# Patient Record
Sex: FEMALE | Race: WHITE | NOT HISPANIC OR LATINO | Employment: FULL TIME | URBAN - METROPOLITAN AREA
[De-identification: names, ages, dates, MRNs, and addresses within clinical notes are randomized per-mention and may not be internally consistent; named-entity substitution may affect disease eponyms.]

---

## 2018-08-14 PROBLEM — J32.9 SINUSITIS: Status: ACTIVE | Noted: 2018-08-14

## 2020-05-18 PROBLEM — M77.11 RIGHT LATERAL EPICONDYLITIS: Status: ACTIVE | Noted: 2020-05-18

## 2020-06-22 ENCOUNTER — OFFICE VISIT (OUTPATIENT)
Dept: PHYSICAL MEDICINE AND REHAB | Facility: CLINIC | Age: 40
End: 2020-06-22
Payer: COMMERCIAL

## 2020-06-22 VITALS — BODY MASS INDEX: 30.21 KG/M2 | WEIGHT: 160 LBS | HEIGHT: 61 IN

## 2020-06-22 DIAGNOSIS — M77.8 TENDINITIS OF RIGHT TRICEPS: Primary | ICD-10-CM

## 2020-06-22 DIAGNOSIS — S46.311A STRAIN OF RIGHT TRICEPS TENDON, INITIAL ENCOUNTER: ICD-10-CM

## 2020-06-22 PROCEDURE — 99243 PR OFFICE CONSULTATION,LEVEL III: ICD-10-PCS | Mod: 25,S$GLB,, | Performed by: PHYSICAL MEDICINE & REHABILITATION

## 2020-06-22 PROCEDURE — 99999 PR PBB SHADOW E&M-EST. PATIENT-LVL III: ICD-10-PCS | Mod: PBBFAC,,, | Performed by: PHYSICAL MEDICINE & REHABILITATION

## 2020-06-22 PROCEDURE — 76882 PR  US,EXTREMITY,NONVASCULAR,REAL-TIME IMAGE,LIMITED: ICD-10-PCS | Mod: S$GLB,,, | Performed by: PHYSICAL MEDICINE & REHABILITATION

## 2020-06-22 PROCEDURE — 99999 PR PBB SHADOW E&M-EST. PATIENT-LVL III: CPT | Mod: PBBFAC,,, | Performed by: PHYSICAL MEDICINE & REHABILITATION

## 2020-06-22 PROCEDURE — 99243 OFF/OP CNSLTJ NEW/EST LOW 30: CPT | Mod: 25,S$GLB,, | Performed by: PHYSICAL MEDICINE & REHABILITATION

## 2020-06-22 PROCEDURE — 76882 US LMTD JT/FCL EVL NVASC XTR: CPT | Mod: S$GLB,,, | Performed by: PHYSICAL MEDICINE & REHABILITATION

## 2020-06-22 NOTE — LETTER
June 22, 2020      Linda Tran MD  [address] [facility] [address]  [address]  [facility] [address]           [facility] Physical Med/Rehab  [address]  [facility] [address]  Phone: [phone]  Fax: [phone]          Patient: Ina Pereira   MR Number: [id number]   YOB: 1980   Date of Visit: 6/22/2020       Dear Dr. Linda Tran:    Thank you for referring Ina Pereira to me for evaluation. Attached you will find relevant portions of my assessment and plan of care.    If you have questions, please do not hesitate to call me. I look forward to following Ina Pereira along with you.    Sincerely,    Gordy Yang MD    Enclosure  CC:  No Recipients    If you would like to receive this communication electronically, please contact [email or web address] or [phone] to request more information on [employer] access.    For providers and/or their staff who would like to refer a patient to [facility], please contact us through our one-stop-shop provider referral line, [facility], at [phone].    If you feel you have received this communication in error or would no longer like to receive these types of communications, please e-mail [email or web address]

## 2020-06-22 NOTE — PROGRESS NOTES
[facility]    Consulting Provider: Linda Tran MD    CHIEF COMPLAINT:   Chief Complaint   Patient presents with    Elbow Pain     Right     HISTORY OF PRESENT ILLNESS: Ina Pereira is a RHD 39 y.o. female who presents to me for evaluation of right elbow pain. The dull pain started 2 years ago with gradual onset, possibly related to pulling her son.  She says that received 3 corticosteroid injections with orthopaedics that provided excellent relief for the first 2 injections with minimal relief for the final injection. Per chart review, she has received 2 olecranon bursa injections and more recently 2 lateral epicondylitis injections. The last lateral epicondyle injection was in April with medrol dose pack, and the patient said the relief felt different than usual and changed her pain.  Picking up her son, any pulling or pushing, and turning a doorknob provokes the pain.  She tried Mobic and completed OT at [facility] with heat, US, with minimal relief and no dry needling.  She endorses weakness at the elbow but denied trauma, neck pain, swelling, or numbness.    Review of Systems   Constitutional: Negative for fever.   HENT: Negative for drooling.    Eyes: Negative for discharge.   Respiratory: Negative for choking.    Cardiovascular: Negative for chest pain.   Genitourinary: Negative for flank pain.   Skin: Negative for wound.   Allergic/Immunologic: Negative for immunocompromised state.   Neurological: Negative for tremors and syncope.   Psychiatric/Behavioral: Negative for behavioral problems.     Past Medical History:   Past Medical History:   Diagnosis Date    Abnormal vaginal Pap smear     ADHD (attention deficit hyperactivity disorder)     Alopecia        Past Surgical History:   Past Surgical History:   Procedure Laterality Date    CERVIX REMOVAL         Family History:   Family History   Problem Relation Age of Onset    Diabetes Mother     Hypertension Father      Diabetes Father     Hyperlipidemia Father        Medications:   Current Outpatient Medications on File Prior to Visit   Medication Sig Dispense Refill    lisdexamfetamine (VYVANSE) 70 MG capsule VYVANSE 70 MG CAPS      meloxicam (MOBIC) 15 MG tablet TAKE 1 TABLET BY MOUTH EVERY DAY 30 tablet 1    methylPREDNISolone (MEDROL DOSEPACK) 4 mg tablet use as directed 1 Package 0     No current facility-administered medications on file prior to visit.        Allergies: Review of patient's allergies indicates:  No Known Allergies    Social History:   Social History     Socioeconomic History    Marital status:      Spouse name: Not on file    Number of children: Not on file    Years of education: Not on file    Highest education level: Not on file   Occupational History    Not on file   Social Needs    Financial resource strain: Not on file    Food insecurity     Worry: Not on file     Inability: Not on file    Transportation needs     Medical: Not on file     Non-medical: Not on file   Tobacco Use    Smoking status: Former Smoker     Types: Vaping with nicotine     Quit date: 2014     Years since quittin.1    Smokeless tobacco: Never Used   Substance and Sexual Activity    Alcohol use: Yes     Comment: rarely    Drug use: Not on file    Sexual activity: Not on file   Lifestyle    Physical activity     Days per week: Not on file     Minutes per session: Not on file    Stress: Not on file   Relationships    Social connections     Talks on phone: Not on file     Gets together: Not on file     Attends Holiness service: Not on file     Active member of club or organization: Not on file     Attends meetings of clubs or organizations: Not on file     Relationship status: Not on file   Other Topics Concern    Not on file   Social History Narrative    Not on file     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     PHYSICAL EXAMINATION:   General    Vitals:    20 1429  "  Weight: 72.6 kg (160 lb)   Height: 5' 1" (1.549 m)     Constitutional: Oriented to person, place, and time. No apparent distress. Pleasant.  HENT:   Head: Normocephalic and atraumatic.   Eyes: Right eye exhibits no discharge. Left eye exhibits no discharge. No scleral icterus.   Pulmonary/Chest: Effort normal. No respiratory distress.   Abdominal: There is no guarding.   Neurological: Alert and oriented to person, place, and time.   Psychiatric: Behavior is normal.   Back Exam     Tenderness   The patient is experiencing no tenderness.     Comments:  Negative Spurling's  2+ DTRs BUE      Right Elbow Exam     Tenderness   Right elbow tenderness location: Point of maximal tenderness is at the triceps insertion upon the olecranon.     Range of Motion   Extension: 0   Flexion: 140   Pronation: normal   Supination: normal     Muscle Strength   Pronation:  5/5   Supination:  5/5     Tests   Varus: negative  Valgus: negative  Tinel's sign (cubital tunnel): negative    Other   Erythema: absent  Scars: absent  Sensation: normal    Comments:  Pain reproduced with resisted elbow extension  Negative Cozen's test        INSPECTION: There is no swelling, ecchymoses, erythema or gross deformity of the elbow.    Imaging  MRI Right elbow 6/8/20  Impression:  1. Subtle edema at the distal insertion of the brachialis tendon suggestive of mild tendinopathy.  2. Edema within the distal fibers of the triceps tendon with adjacent peritendinous edema compatible with tendinopathy.  3. Trace edema within the common extensor tendon origin may reflect minimal tendinopathy.  4. There is increased T2 signal demonstrated within the ulnar nerve at the cubital tunnel level should be correlated for ulnar neuropathy..     X-ray right elbow 5/29/18  IMPRESSION  No displaced fracture or dislocation is appreciated.    Diagnostic Ultrasound:  Limited diagnostic ultrasound was performed today of the right elbow. The images were saved will be uploaded to " EMR.  The common extensor tendon was observed to attach upon the lateral epicondyle of the humerus.  There is no anechoic gapping within the tendon substance that would suggest significant tearing.  There was no lateral elbow joint effusion.  The radial collateral ligament was intact. There was no significant cortical irregularity of the lateral condyle humerus. There is no significant hyperemia or neovascularization seen within the tendon on color Doppler function.  The proximal portion of the common extensor tendon was non-tender to sonopalpation.    The triceps tendon was then visualized and long-axis attaching upon the olecranon.  There was some cortical irregularity of the attachment site upon the olecranon process.  There also appeared to be hyperechoic osteophyte projecting proximally into the tendon substance.  This area was tender to so no palpation.  There was a mild degree of hyperemia of the soft tendon tissue adjacent to the enthesophyte.  There was no obvious tearing.    Data Reviewed: X-ray, MRI, ultrasound    Supportive Actions: Independent visualization of images or test specimens    ASSESSMENT:   1. Strain of right triceps tendon, initial encounter    2. Lateral epicondylitis, right elbow      PLAN:      1. Time was spent reviewing the above diagnosis in depth with Ina today, including acute management and rehabilitation.  Her symptoms, physical exam, and diagnostic ultrasound exam are suggestive of triceps tendinopathy being the primary generator of her symptoms, with lateral epicondylosis likely be secondary.  Diagnostic ultrasound exam today of the common extensor and triceps tendon did show some degree of pathology in the form of chronic tendinosis.  Mild hyperemia was noted in both tendons but there were no significant defects in the tendon or significant joint effusion.  We discussed additional conservative measures to include a peritendinous triceps  corticosteroid injection, PRP, Tenex  "as well as bracing and occupational therapy to specifically address triceps tendinopathy.      2. We discussed the potential deleterious effects of repeated injections of corticosteroid, especially considering her pain continues to return after previous injections.  She reports her level of function and quality of life is significantly decreased in recent weeks due to her elbow pain. We discussed the duration of recovery and potentially increased pain associated with PRP and tenex. After discussion, she would like to proceed with tenex as opposed to restarting formal occupational therapy or considering PRP.  we discussed that it will be important to reduce forces more than 5 lb especially for the 1st 2 weeks after the Tenex procedure.  We discussed the course of improvement will likely be gradual it may take over 12 weeks.  We discussed that this procedure does not guarantee complete or permanent pain resolution or the need for further interventions.  She voiced understanding wished to proceed.    3. RTC for scheduled right triceps Tenex.  If the pain persists, we can also consider PRP.    This is a consult from Dr. Linda Tran. Please see the "Communications" section of Epic to see how the consulting physician received the report of today's findings and recommendations. If it's an [facility] physician, it will be forwarded to his/her "in basket".    The above note was completed, in part, with the aid of Dragon dictation software/hardware. Translation errors may be present.    "

## 2020-06-28 ENCOUNTER — CLINICAL SUPPORT (OUTPATIENT)
Dept: URGENT CARE | Facility: CLINIC | Age: 40
End: 2020-06-28
Payer: COMMERCIAL

## 2020-06-28 VITALS — TEMPERATURE: 98 F

## 2020-06-28 DIAGNOSIS — M77.8 TENDINITIS OF RIGHT TRICEPS: ICD-10-CM

## 2020-06-28 DIAGNOSIS — S46.311A STRAIN OF RIGHT TRICEPS TENDON, INITIAL ENCOUNTER: ICD-10-CM

## 2020-06-28 PROCEDURE — U0003 INFECTIOUS AGENT DETECTION BY NUCLEIC ACID (DNA OR RNA); SEVERE ACUTE RESPIRATORY SYNDROME CORONAVIRUS 2 (SARS-COV-2) (CORONAVIRUS DISEASE [COVID-19]), AMPLIFIED PROBE TECHNIQUE, MAKING USE OF HIGH THROUGHPUT TECHNOLOGIES AS DESCRIBED BY CMS-2020-01-R: HCPCS

## 2020-07-01 DIAGNOSIS — M77.8 TRICEPS TENDONITIS: Primary | ICD-10-CM

## 2020-07-02 LAB — SARS-COV-2 RNA RESP QL NAA+PROBE: NOT DETECTED

## 2020-07-08 DIAGNOSIS — M25.521 RIGHT ELBOW PAIN: Primary | ICD-10-CM

## 2020-07-08 PROBLEM — M77.11 RIGHT LATERAL EPICONDYLITIS: Status: RESOLVED | Noted: 2020-05-18 | Resolved: 2020-07-08

## 2020-07-08 RX ORDER — SODIUM CHLORIDE, SODIUM LACTATE, POTASSIUM CHLORIDE, CALCIUM CHLORIDE 600; 310; 30; 20 MG/100ML; MG/100ML; MG/100ML; MG/100ML
INJECTION, SOLUTION INTRAVENOUS CONTINUOUS
Status: CANCELLED | OUTPATIENT
Start: 2020-07-08

## 2020-07-08 RX ORDER — LIDOCAINE HYDROCHLORIDE 10 MG/ML
1 INJECTION, SOLUTION EPIDURAL; INFILTRATION; INTRACAUDAL; PERINEURAL ONCE
Status: CANCELLED | OUTPATIENT
Start: 2020-07-08 | End: 2020-07-08

## 2020-07-08 RX ORDER — MIDAZOLAM HYDROCHLORIDE 1 MG/ML
2 INJECTION INTRAMUSCULAR; INTRAVENOUS ONCE AS NEEDED
Status: CANCELLED | OUTPATIENT
Start: 2020-07-08 | End: 2031-12-05

## 2020-07-09 ENCOUNTER — TELEPHONE (OUTPATIENT)
Dept: PHYSICAL MEDICINE AND REHAB | Facility: CLINIC | Age: 40
End: 2020-07-09

## 2020-07-09 DIAGNOSIS — Z01.818 PREOP TESTING: Primary | ICD-10-CM

## 2020-07-21 ENCOUNTER — LAB VISIT (OUTPATIENT)
Dept: FAMILY MEDICINE | Facility: CLINIC | Age: 40
End: 2020-07-21
Payer: COMMERCIAL

## 2020-07-21 DIAGNOSIS — Z01.818 PREOP TESTING: ICD-10-CM

## 2020-07-21 PROCEDURE — U0003 INFECTIOUS AGENT DETECTION BY NUCLEIC ACID (DNA OR RNA); SEVERE ACUTE RESPIRATORY SYNDROME CORONAVIRUS 2 (SARS-COV-2) (CORONAVIRUS DISEASE [COVID-19]), AMPLIFIED PROBE TECHNIQUE, MAKING USE OF HIGH THROUGHPUT TECHNOLOGIES AS DESCRIBED BY CMS-2020-01-R: HCPCS

## 2020-07-22 LAB — SARS-COV-2 RNA RESP QL NAA+PROBE: NOT DETECTED

## 2020-07-24 ENCOUNTER — HOSPITAL ENCOUNTER (OUTPATIENT)
Facility: HOSPITAL | Age: 40
Discharge: HOME | End: 2020-07-24
Attending: PHYSICAL MEDICINE & REHABILITATION | Admitting: PHYSICAL MEDICINE & REHABILITATION
Payer: COMMERCIAL

## 2020-07-24 VITALS
HEART RATE: 88 BPM | SYSTOLIC BLOOD PRESSURE: 131 MMHG | WEIGHT: 158 LBS | RESPIRATION RATE: 18 BRPM | DIASTOLIC BLOOD PRESSURE: 82 MMHG | OXYGEN SATURATION: 100 % | BODY MASS INDEX: 31.02 KG/M2 | HEIGHT: 60 IN | TEMPERATURE: 97 F

## 2020-07-24 DIAGNOSIS — M77.11 RIGHT LATERAL EPICONDYLITIS: Primary | ICD-10-CM

## 2020-07-24 DIAGNOSIS — M25.521 RIGHT ELBOW PAIN: ICD-10-CM

## 2020-07-24 LAB
B-HCG UR QL: NEGATIVE
CTP QC/QA: YES

## 2020-07-24 PROCEDURE — 36000707: Mod: PO | Performed by: PHYSICAL MEDICINE & REHABILITATION

## 2020-07-24 PROCEDURE — 81025 URINE PREGNANCY TEST: CPT | Mod: PO | Performed by: STUDENT IN AN ORGANIZED HEALTH CARE EDUCATION/TRAINING PROGRAM

## 2020-07-24 PROCEDURE — 63600175 PHARM REV CODE 636 W HCPCS: Mod: PO | Performed by: STUDENT IN AN ORGANIZED HEALTH CARE EDUCATION/TRAINING PROGRAM

## 2020-07-24 PROCEDURE — 99152 MOD SED SAME PHYS/QHP 5/>YRS: CPT | Mod: ,,, | Performed by: PHYSICAL MEDICINE & REHABILITATION

## 2020-07-24 PROCEDURE — 36000706: Mod: PO | Performed by: PHYSICAL MEDICINE & REHABILITATION

## 2020-07-24 PROCEDURE — 24357 PR TENOTOMY ELBOW LATERAL/MEDIAL PERCUTANEOUS: ICD-10-PCS | Mod: RT,,, | Performed by: PHYSICAL MEDICINE & REHABILITATION

## 2020-07-24 PROCEDURE — 99152 PR MOD CONSCIOUS SEDATION, SAME PHYS, 5+ YRS, FIRST 15 MIN: ICD-10-PCS | Mod: ,,, | Performed by: PHYSICAL MEDICINE & REHABILITATION

## 2020-07-24 PROCEDURE — 24357 REPAIR ELBOW PERC: CPT | Mod: RT,,, | Performed by: PHYSICAL MEDICINE & REHABILITATION

## 2020-07-24 PROCEDURE — 63600175 PHARM REV CODE 636 W HCPCS: Mod: PO | Performed by: PHYSICAL MEDICINE & REHABILITATION

## 2020-07-24 RX ORDER — SODIUM CHLORIDE, SODIUM LACTATE, POTASSIUM CHLORIDE, CALCIUM CHLORIDE 600; 310; 30; 20 MG/100ML; MG/100ML; MG/100ML; MG/100ML
INJECTION, SOLUTION INTRAVENOUS CONTINUOUS
Status: DISCONTINUED | OUTPATIENT
Start: 2020-07-24 | End: 2020-07-24 | Stop reason: HOSPADM

## 2020-07-24 RX ORDER — LIDOCAINE HYDROCHLORIDE 10 MG/ML
1 INJECTION, SOLUTION EPIDURAL; INFILTRATION; INTRACAUDAL; PERINEURAL ONCE
Status: DISCONTINUED | OUTPATIENT
Start: 2020-07-24 | End: 2020-07-24 | Stop reason: HOSPADM

## 2020-07-24 RX ORDER — FENTANYL CITRATE 50 UG/ML
INJECTION, SOLUTION INTRAMUSCULAR; INTRAVENOUS
Status: DISCONTINUED | OUTPATIENT
Start: 2020-07-24 | End: 2020-07-24 | Stop reason: HOSPADM

## 2020-07-24 RX ORDER — TRAMADOL HYDROCHLORIDE 50 MG/1
50 TABLET ORAL EVERY 4 HOURS PRN
Qty: 6 TABLET | Refills: 0 | Status: SHIPPED | OUTPATIENT
Start: 2020-07-24 | End: 2021-12-15

## 2020-07-24 RX ORDER — MIDAZOLAM HYDROCHLORIDE 1 MG/ML
2 INJECTION INTRAMUSCULAR; INTRAVENOUS ONCE AS NEEDED
Status: COMPLETED | OUTPATIENT
Start: 2020-07-24 | End: 2020-07-24

## 2020-07-24 RX ADMIN — SODIUM CHLORIDE, SODIUM LACTATE, POTASSIUM CHLORIDE, AND CALCIUM CHLORIDE: .6; .31; .03; .02 INJECTION, SOLUTION INTRAVENOUS at 11:07

## 2020-07-24 NOTE — OP NOTE
Operative Note       Surgery Date: 7/24/2020     Surgeon(s) and Role:     * Gordy Yang MD - Primary    Pre-op Diagnosis:  Triceps tendonitis [M77.9]    Post-op Diagnosis: Post-Op Diagnosis Codes:     * Triceps tendonitis [M77.9]    Procedure(s) (LRB):  Ultrasonic percutaneous tenotomy of the right common extensor tendon (Right)    Anesthesia: RN IV Sedation    Procedure in Detail/Findings:    Indications:       This is a 40 y.o. female with recalcitrant right common extensor tendon tendinosis who presents for elective percutaneous tenotomy of the right elbow. We discussed the risk, benefits and alternatives, and she elected to proceed.       Description of Procedure:       Once she was brought to the operating room, a time-out was performed confirming the name, the location and the procedure. The patient was the patient was placed in the supine position with the right elbow flexed to about 40 degrees. The skin overlying the right common extensor tendon was prepped using a ChloraPrep solution. Using ultrasound, the anterior/proximal common extensor tendon was observed to be heterogenous, consistent with a diagnosis of lateral epicondylosis. After diffuse administration of lidocaine 1%, a #11 blade was used to make a small incision. A 18g angiocath was then used to create a tract for the Tenex device. The TX1 handpiece was then introduced down to the tendon defects. With 1 minute and 54 seconds of energy time, the tendon defect was ablated. The Tenex hand piece was then removed. A sterile compression dressing was applied with an ACE wrap. The patient was returned to the recovery area in good condition. She was instructed to not lift more than 5 lbs until cleared by me. We will follow up with her in two weeks in the clinic.    Estimated Blood Loss: Minimal           Condition: Good    Attestation:  I performed the procedure.    The patient was given conscious sedation by a registered nurse with me in  attendance. The agents used were fentanyl and Versed. There was continuous monitoring of EKG, blood pressure and pulse oximetry. Total time for conscious sedation was 50 minutes.          Discharge Note    Admit Date: 7/24/2020    Attending Physician: Gordy Yang MD     Discharge Physician: Gordy Yang MD    Final Diagnosis: Post-Op Diagnosis Codes:     * Triceps tendonitis [M77.9]    Disposition: Home or Self Care, pt discharged and will f/u in clinic in 2 weeks.    Patient Instructions:   Current Discharge Medication List      CONTINUE these medications which have NOT CHANGED    Details   lisdexamfetamine (VYVANSE) 70 MG capsule VYVANSE 70 MG CAPS      meloxicam (MOBIC) 15 MG tablet TAKE 1 TABLET BY MOUTH EVERY DAY  Qty: 30 tablet, Refills: 1             Discharge Procedure Orders (must include Diet, Follow-up, Activity)   Discharge Procedure Orders (must include Diet, Follow-up, Activity)   Diet general     Ice to affected area     Call MD for:  temperature >100.4     Call MD for:  persistent nausea and vomiting     Call MD for:  severe uncontrolled pain     Call MD for:  difficulty breathing, headache or visual disturbances     Call MD for:  redness, tenderness, or signs of infection (pain, swelling, redness, odor or green/yellow discharge around incision site)     Call MD for:  hives     Call MD for:  persistent dizziness or light-headedness     Call MD for:  extreme fatigue     Remove dressing in 48 hours     Shower on day dressing removed (No bath)        Discharge Date: 7/24/20

## 2020-07-24 NOTE — PLAN OF CARE
Patient refuses oral liquids at this time. No apparent s&s of distress noted at this time, no complaints voiced at this time. Injection site free from redness and drainage.  Discharge instructions reviewed with patient/family/friend with good verbal feedback received. Patient ready for discharge

## 2020-07-24 NOTE — DISCHARGE INSTRUCTIONS
Tenex Elbow    General Instructions   Keep bandages and procedure area clean and dry    Remove dressing in 48 hours   May appply ice for 20 minutes off/on as needed for discomfort   Take over the counter pain medication (tylenol) as directed by the    Avoid ibuprofen, advilm motrin, aleve or naproxen   Avoid submerging the are in water (i.e. Swimmin/bathin/hot tube) for 5 days   Wear sling at all times except for bathing       Rest elbow today   No lifting more than a coffee cup with the effected arm for 2 weeks   Start daily gentle, non-painful range of motion exercise on 3rd day   Sedentary body activity for 3 weeks   Follow up in 2-3 weeks     Contact office at  [phone]   If area becomes red or hot to touch   For increased pain or swelling   Drainage from the site

## 2020-07-24 NOTE — H&P
CHIEF COMPLAINT:        Chief Complaint   Patient presents with    Elbow Pain       Right     HISTORY OF PRESENT ILLNESS: nIa Pereira is a RHD 39 y.o. female who presents to me for elective right elbow Tenex. She reports more focal pain at the common extensor tendon origin today.    Review of Systems   Constitutional: Negative for fever.   HENT: Negative for drooling.    Eyes: Negative for discharge.   Respiratory: Negative for choking.    Cardiovascular: Negative for chest pain.   Genitourinary: Negative for flank pain.   Skin: Negative for wound.   Allergic/Immunologic: Negative for immunocompromised state.   Neurological: Negative for tremors and syncope.   Psychiatric/Behavioral: Negative for behavioral problems.      Past Medical History:        Past Medical History:   Diagnosis Date    Abnormal vaginal Pap smear      ADHD (attention deficit hyperactivity disorder)      Alopecia          Past Surgical History:         Past Surgical History:   Procedure Laterality Date    CERVIX REMOVAL            Family History:         Family History   Problem Relation Age of Onset    Diabetes Mother      Hypertension Father      Diabetes Father      Hyperlipidemia Father          Medications:          Current Outpatient Medications on File Prior to Visit   Medication Sig Dispense Refill    lisdexamfetamine (VYVANSE) 70 MG capsule VYVANSE 70 MG CAPS        meloxicam (MOBIC) 15 MG tablet TAKE 1 TABLET BY MOUTH EVERY DAY 30 tablet 1    methylPREDNISolone (MEDROL DOSEPACK) 4 mg tablet use as directed 1 Package 0      No current facility-administered medications on file prior to visit.         Allergies: Review of patient's allergies indicates:  No Known Allergies     Social History:   Social History            Socioeconomic History    Marital status:        Spouse name: Not on file    Number of children: Not on file    Years of education: Not on file    Highest education level: Not on file    Occupational History    Not on file   Social Needs    Financial resource strain: Not on file    Food insecurity       Worry: Not on file       Inability: Not on file    Transportation needs       Medical: Not on file       Non-medical: Not on file   Tobacco Use    Smoking status: Former Smoker       Types: Vaping with nicotine       Quit date: 2014       Years since quittin.1    Smokeless tobacco: Never Used   Substance and Sexual Activity    Alcohol use: Yes       Comment: rarely    Drug use: Not on file    Sexual activity: Not on file   Lifestyle    Physical activity       Days per week: Not on file       Minutes per session: Not on file    Stress: Not on file   Relationships    Social connections       Talks on phone: Not on file       Gets together: Not on file       Attends Mosque service: Not on file       Active member of club or organization: Not on file       Attends meetings of clubs or organizations: Not on file       Relationship status: Not on file   Other Topics Concern    Not on file   Social History Narrative    Not on file     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      PHYSICAL EXAMINATION:   General           VSS  Constitutional: Oriented to person, place, and time. No apparent distress. Pleasant.  HENT:   Head: Normocephalic and atraumatic.   Eyes: Right eye exhibits no discharge. Left eye exhibits no discharge. No scleral icterus.   Pulmonary/Chest: Effort normal. No respiratory distress.   Abdominal: There is no guarding.   Neurological: Alert and oriented to person, place, and time.   Psychiatric: Behavior is normal.   Right Elbow Exam      Tenderness   Right elbow tenderness location: Point of maximal tenderness is at the lateral epicondyle, less so at triceps insertion     Range of Motion   Extension: 0   Flexion: 140   Pronation: normal   Supination: normal      Muscle Strength   Pronation:  5/5   Supination:  5/5      Tests   Varus:  negative  Valgus: negative  Tinel's sign (cubital tunnel): negative     Other   Erythema: absent  Scars: absent  Sensation: normal     Comments:  Pain with resisted elbow extension  + Cozen's test     INSPECTION: There is no swelling, ecchymoses, erythema or gross deformity of the right elbow.     Imaging  MRI Right elbow 6/8/20  Impression:  1. Subtle edema at the distal insertion of the brachialis tendon suggestive of mild tendinopathy.  2. Edema within the distal fibers of the triceps tendon with adjacent peritendinous edema compatible with tendinopathy.  3. Trace edema within the common extensor tendon origin may reflect minimal tendinopathy.  4. There is increased T2 signal demonstrated within the ulnar nerve at the cubital tunnel level should be correlated for ulnar neuropathy..      X-ray right elbow 5/29/18  IMPRESSION  No displaced fracture or dislocation is appreciated.     Diagnostic Ultrasound:  Limited diagnostic ultrasound was performed today of the right elbow. The images were saved will be uploaded to EMR.  The common extensor tendon was observed to attach upon the lateral epicondyle of the humerus.  There is no anechoic gapping within the tendon substance that would suggest significant tearing.  There was no lateral elbow joint effusion.  The radial collateral ligament was intact. There was no significant cortical irregularity of the lateral condyle humerus. There is no significant hyperemia or neovascularization seen within the tendon on color Doppler function.  The proximal portion of the common extensor tendon was tender to sonopalpation.     The triceps tendon was then visualized and long-axis attaching upon the olecranon.  There was some cortical irregularity of the attachment site upon the olecranon process.  There also appeared to be hyperechoic osteophyte projecting proximally into the tendon substance.  This area was tender to so no palpation, but less so than the triceps.  There was a mild  degree of hyperemia of the soft tendon tissue adjacent to the enthesophyte.  There was no obvious tearing.     Data Reviewed: X-ray, MRI, ultrasound     Supportive Actions: Independent visualization of images or test specimens     ASSESSMENT:   1. Strain of right triceps tendon, initial encounter    2. Lateral epicondylitis, right elbow      PLAN:       1. Her pain has changed compared to clinic visit last month. She reports very focal pain at the lateral epicondyle. Direct palpation of the area under ultrasound reproduces her pain. Palpation of the triceps tendon is less tender. She wishes to proceed today with Tenex of the CET. Pt consented.       2. Malampatti score II, ASA 1.     3. RTC in 2 weeks.

## 2020-08-06 ENCOUNTER — OFFICE VISIT (OUTPATIENT)
Dept: PHYSICAL MEDICINE AND REHAB | Facility: CLINIC | Age: 40
End: 2020-08-06
Payer: COMMERCIAL

## 2020-08-06 VITALS — HEIGHT: 61 IN | BODY MASS INDEX: 28.32 KG/M2 | WEIGHT: 150 LBS

## 2020-08-06 DIAGNOSIS — M77.11 RIGHT LATERAL EPICONDYLITIS: Primary | ICD-10-CM

## 2020-08-06 PROCEDURE — 99999 PR PBB SHADOW E&M-EST. PATIENT-LVL III: ICD-10-PCS | Mod: PBBFAC,,, | Performed by: PHYSICAL MEDICINE & REHABILITATION

## 2020-08-06 PROCEDURE — 99999 PR PBB SHADOW E&M-EST. PATIENT-LVL III: CPT | Mod: PBBFAC,,, | Performed by: PHYSICAL MEDICINE & REHABILITATION

## 2020-08-06 PROCEDURE — 99024 POSTOP FOLLOW-UP VISIT: CPT | Mod: S$GLB,,, | Performed by: PHYSICAL MEDICINE & REHABILITATION

## 2020-08-06 PROCEDURE — 99024 PR POST-OP FOLLOW-UP VISIT: ICD-10-PCS | Mod: S$GLB,,, | Performed by: PHYSICAL MEDICINE & REHABILITATION

## 2020-08-06 NOTE — LETTER
August 6, 2020    Ina Pereira  [address] [place] [address]             [place] [facility]  Physical Medicine and Rehabilitation  [address]  [place] [address]  Phone: [phone]  Fax: [phone]   August 6, 2020     Patient: Ina Pereira   YOB: 1980   Date of Visit: 8/6/2020       To Whom it May Concern:    Ina Pereira was seen in my clinic on 8/6/2020. She is clear to return to work on 8/12/2020 without restrictions.       If you have any questions or concerns, please don't hesitate to call.    Sincerely,         Gordy Yang MD

## 2020-08-06 NOTE — PROGRESS NOTES
[facility]    CHIEF COMPLAINT:   Chief Complaint   Patient presents with    Follow-up     2wk s/p Tenex     HISTORY OF PRESENT ILLNESS: Ina Pereira is a RHD 40 y.o. female who returns today for follow up. She is 2 wk s/p R common extensor tendon tenex procedure. Reports 50% better than pre-procedure. Occasional soreness when pushing through her right hand with the elbow in full extension. No numbness or tingling complaints.  She reports that she cannot sleep on the right side without pain which she could not do before the procedure.  Overall, very pleased with her response thus far.     Review of Systems   Constitutional: Negative for fever.   HENT: Negative for drooling.    Eyes: Negative for discharge.   Respiratory: Negative for choking.    Cardiovascular: Negative for chest pain.   Genitourinary: Negative for flank pain.   Skin: Negative for wound.   Allergic/Immunologic: Negative for immunocompromised state.   Neurological: Negative for tremors and syncope.   Psychiatric/Behavioral: Negative for behavioral problems.     Past Medical History:   Past Medical History:   Diagnosis Date    Abnormal vaginal Pap smear     ADHD (attention deficit hyperactivity disorder)     Alopecia     General anesthetics causing adverse effect in therapeutic use        Past Surgical History:   Past Surgical History:   Procedure Laterality Date    CERVIX REMOVAL      cone biopsy      PERCUTANEOUS TENOTOMY OF ELBOW Right 7/24/2020    Procedure: Ultrasonic percutaneous tenotomy of the right common extensor tendon;  Surgeon: Gordy Yang MD;  [facility];  Service: Orthopedics;  Laterality: Right;    WISDOM TOOTH EXTRACTION         Family History:   Family History   Problem Relation Age of Onset    Diabetes Mother     Hypertension Father     Diabetes Father     Hyperlipidemia Father        Medications:   Current Outpatient Medications on File Prior to Visit   Medication Sig  Dispense Refill    lisdexamfetamine (VYVANSE) 70 MG capsule VYVANSE 70 MG CAPS      traMADoL (ULTRAM) 50 mg tablet Take 1 tablet (50 mg total) by mouth every 4 (four) hours as needed for Pain. (Patient not taking: Reported on 2020) 6 tablet 0     No current facility-administered medications on file prior to visit.        Allergies: Review of patient's allergies indicates:  No Known Allergies    Social History:   Social History     Socioeconomic History    Marital status:      Spouse name: Not on file    Number of children: Not on file    Years of education: Not on file    Highest education level: Not on file   Occupational History    Not on file   Social Needs    Financial resource strain: Not very hard    Food insecurity     Worry: Never true     Inability: Never true    Transportation needs     Medical: No     Non-medical: No   Tobacco Use    Smoking status: Current Every Day Smoker     Types: Vaping with nicotine     Last attempt to quit: 2014     Years since quittin.2    Smokeless tobacco: Never Used   Substance and Sexual Activity    Alcohol use: Yes     Frequency: 2-3 times a week     Drinks per session: 1 or 2     Binge frequency: Less than monthly     Comment: rarely    Drug use: Never    Sexual activity: Not on file   Lifestyle    Physical activity     Days per week: 3 days     Minutes per session: 30 min    Stress: Only a little   Relationships    Social connections     Talks on phone: More than three times a week     Gets together: Once a week     Attends Holiness service: Not on file     Active member of club or organization: No     Attends meetings of clubs or organizations: Patient refused     Relationship status:    Other Topics Concern    Not on file   Social History Narrative    Not on file     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     PHYSICAL EXAMINATION:   General    Vitals:    20 1250   Weight: 68 kg (150 lb)  "  Height: 5' 1" (1.549 m)     Constitutional: Oriented to person, place, and time. No apparent distress. Pleasant.  HENT:   Head: Normocephalic and atraumatic.   Eyes: Right eye exhibits no discharge. Left eye exhibits no discharge. No scleral icterus.   Pulmonary/Chest: Effort normal. No respiratory distress.   Abdominal: There is no guarding.   Neurological: Alert and oriented to person, place, and time.   Psychiatric: Behavior is normal.   Right Elbow Exam     Tenderness   The patient is experiencing tenderness in the lateral epicondyle (Mildly tender along the triceps insertion upon the olecranon).     Range of Motion   Extension: 0   Flexion: 140   Pronation: normal   Supination: normal     Muscle Strength   Pronation:  5/5   Supination:  5/5     Tests   Varus: negative  Valgus: negative  Tinel's sign (cubital tunnel): negative    Other   Erythema: absent  Scars: absent  Sensation: normal    Comments:  Negative Cozen's test        INSPECTION: There is no swelling, erythema or gross deformity of the elbow.  Positive ecchymosis over the lateral elbow.    Imaging  MRI Right elbow 6/8/20  Impression:  1. Subtle edema at the distal insertion of the brachialis tendon suggestive of mild tendinopathy.  2. Edema within the distal fibers of the triceps tendon with adjacent peritendinous edema compatible with tendinopathy.  3. Trace edema within the common extensor tendon origin may reflect minimal tendinopathy.  4. There is increased T2 signal demonstrated within the ulnar nerve at the cubital tunnel level should be correlated for ulnar neuropathy..     X-ray right elbow 5/29/18:  No displaced fracture or dislocation is appreciated.    Data Reviewed: X-ray, MRI    Supportive Actions: Independent visualization of images or test specimens    ASSESSMENT:   1. Right lateral epicondylitis      PLAN:     1. Ina has responded quite well to the Tenex procedure thus far.  We had initially planned to do the triceps tendon, " however the morning of the procedure she is reporting more symptoms consistent with a diagnosis of lateral epicondylosis.  Ultrasound was performed which did feel pathologic tissue at the common extensor tendon.  Overall, she is reporting reduction in pain at the lateral right elbow compared to before the procedure.  We discussed that the area is still healing at this point 2 weeks post Tenex procedure.  We discussed the importance of continuing to reduce use and force through the elbow over the next 2-3 weeks.  She can return to work, however she is mindful of avoiding heavy lifting and will seek help if needed.  She was also encouraged continue daily stretching of the common extensor tendons.    2.  We will plan to reach out to her in the next 2-3 weeks by phone to assess her progress.  She was encouraged to reach out if any questions or issues arise in the meantime.    The above note was completed, in part, with the aid of Dragon dictation software/hardware. Translation errors may be present.

## 2020-08-21 ENCOUNTER — PATIENT MESSAGE (OUTPATIENT)
Dept: PHYSICAL MEDICINE AND REHAB | Facility: CLINIC | Age: 40
End: 2020-08-21

## 2020-08-25 ENCOUNTER — PATIENT MESSAGE (OUTPATIENT)
Dept: PHYSICAL MEDICINE AND REHAB | Facility: CLINIC | Age: 40
End: 2020-08-25

## 2020-08-25 DIAGNOSIS — M25.519 SHOULDER PAIN, UNSPECIFIED CHRONICITY, UNSPECIFIED LATERALITY: Primary | ICD-10-CM

## 2020-08-25 DIAGNOSIS — M25.521 RIGHT ELBOW PAIN: ICD-10-CM

## 2020-08-26 ENCOUNTER — TELEPHONE (OUTPATIENT)
Dept: PHYSICAL MEDICINE AND REHAB | Facility: CLINIC | Age: 40
End: 2020-08-26

## 2020-08-26 NOTE — TELEPHONE ENCOUNTER
----- Message from Gordy Yang MD sent at 8/25/2020  5:21 PM CDT -----  Please give her a call to let her know the neck and shoulder xrays showed mild wear and tear, along with some suggestion of osteopenia. Overall though, nothing to explain her arm pain. We discussed giving it a couple more weeks, but if not better, the next step is to come back in for re-examination.   ----- Message -----  From: Gordy Yang MD  Sent: 8/25/2020   5:15 PM CDT  To: Gordy Yang MD    Please give her a call to let her know the neck and shoulder xrays showed mild wear and tear, along with some suggestion of osteopenia. Overall though, nothing to explain her arm pain. We discussed giving it a couple more weeks, but if not better, the next step is to come back in for re-examination.

## 2020-09-10 ENCOUNTER — TELEPHONE (OUTPATIENT)
Dept: PHYSICAL MEDICINE AND REHAB | Facility: CLINIC | Age: 40
End: 2020-09-10

## 2020-09-28 ENCOUNTER — OFFICE VISIT (OUTPATIENT)
Dept: PHYSICAL MEDICINE AND REHAB | Facility: CLINIC | Age: 40
End: 2020-09-28
Payer: COMMERCIAL

## 2020-09-28 VITALS — BODY MASS INDEX: 29.45 KG/M2 | WEIGHT: 156 LBS | HEIGHT: 61 IN

## 2020-09-28 DIAGNOSIS — M77.8 TENDINITIS OF RIGHT TRICEPS: Primary | ICD-10-CM

## 2020-09-28 PROCEDURE — 99024 POSTOP FOLLOW-UP VISIT: CPT | Mod: S$GLB,,, | Performed by: PHYSICAL MEDICINE & REHABILITATION

## 2020-09-28 PROCEDURE — 99999 PR PBB SHADOW E&M-EST. PATIENT-LVL III: ICD-10-PCS | Mod: PBBFAC,,, | Performed by: PHYSICAL MEDICINE & REHABILITATION

## 2020-09-28 PROCEDURE — 99999 PR PBB SHADOW E&M-EST. PATIENT-LVL III: CPT | Mod: PBBFAC,,, | Performed by: PHYSICAL MEDICINE & REHABILITATION

## 2020-09-28 PROCEDURE — 99024 PR POST-OP FOLLOW-UP VISIT: ICD-10-PCS | Mod: S$GLB,,, | Performed by: PHYSICAL MEDICINE & REHABILITATION

## 2020-09-29 DIAGNOSIS — M77.9 ACUTE CALCIFIC PERIARTHRITIS: Primary | ICD-10-CM

## 2020-09-29 RX ORDER — LIDOCAINE HYDROCHLORIDE 10 MG/ML
1 INJECTION, SOLUTION EPIDURAL; INFILTRATION; INTRACAUDAL; PERINEURAL ONCE
Status: CANCELLED | OUTPATIENT
Start: 2020-09-29 | End: 2020-09-29

## 2020-09-29 RX ORDER — MIDAZOLAM HYDROCHLORIDE 1 MG/ML
2 INJECTION INTRAMUSCULAR; INTRAVENOUS ONCE AS NEEDED
Status: CANCELLED | OUTPATIENT
Start: 2020-09-29 | End: 2032-02-26

## 2020-09-29 RX ORDER — SODIUM CHLORIDE, SODIUM LACTATE, POTASSIUM CHLORIDE, CALCIUM CHLORIDE 600; 310; 30; 20 MG/100ML; MG/100ML; MG/100ML; MG/100ML
INJECTION, SOLUTION INTRAVENOUS CONTINUOUS
Status: CANCELLED | OUTPATIENT
Start: 2020-09-29

## 2020-09-29 NOTE — PROGRESS NOTES
[facility]    CHIEF COMPLAINT:   Chief Complaint   Patient presents with    Elbow Pain     right     HISTORY OF PRESENT ILLNESS: Ina Pereira is a RHD 40 y.o. female who returns today for follow up. She is about 2 weeks status post Tenex procedure to the right common extensor tendon.  She reports she may gradual improvement and was largely pain free for about 6 weeks.  She notes over the past couple of weeks she has been experiencing the insidious onset of posterior right elbow pain the pain sometimes radiates upward all the way to her neck.  She rates her pain as a 3 on a scale of 1-10.  The pain is increased with increased use of the right arm.  There is some swelling and bruising of the area.    Review of Systems   Constitutional: Negative for fever.   HENT: Negative for drooling.    Eyes: Negative for discharge.   Respiratory: Negative for choking.    Cardiovascular: Negative for chest pain.   Genitourinary: Negative for flank pain.   Skin: Negative for wound.   Allergic/Immunologic: Negative for immunocompromised state.   Neurological: Negative for tremors and syncope.   Psychiatric/Behavioral: Negative for behavioral problems.     Past Medical History:   Past Medical History:   Diagnosis Date    Abnormal vaginal Pap smear     ADHD (attention deficit hyperactivity disorder)     Alopecia     General anesthetics causing adverse effect in therapeutic use        Past Surgical History:   Past Surgical History:   Procedure Laterality Date    CERVIX REMOVAL      cone biopsy      PERCUTANEOUS TENOTOMY OF ELBOW Right 7/24/2020    Procedure: Ultrasonic percutaneous tenotomy of the right common extensor tendon;  Surgeon: Gordy Yang MD;  Location: [facility];  Service: Orthopedics;  Laterality: Right;    WISDOM TOOTH EXTRACTION         Family History:   Family History   Problem Relation Age of Onset    Diabetes Mother     Hypertension Father     Diabetes Father      Hyperlipidemia Father        Medications:   Current Outpatient Medications on File Prior to Visit   Medication Sig Dispense Refill    lisdexamfetamine (VYVANSE) 70 MG capsule VYVANSE 70 MG CAPS      traMADoL (ULTRAM) 50 mg tablet Take 1 tablet (50 mg total) by mouth every 4 (four) hours as needed for Pain. (Patient not taking: Reported on 2020) 6 tablet 0     No current facility-administered medications on file prior to visit.        Allergies: Review of patient's allergies indicates:  No Known Allergies    Social History:   Social History     Socioeconomic History    Marital status:      Spouse name: Not on file    Number of children: Not on file    Years of education: Not on file    Highest education level: Not on file   Occupational History    Not on file   Social Needs    Financial resource strain: Not very hard    Food insecurity     Worry: Never true     Inability: Never true    Transportation needs     Medical: No     Non-medical: No   Tobacco Use    Smoking status: Current Every Day Smoker     Types: Vaping with nicotine     Last attempt to quit: 2014     Years since quittin.3    Smokeless tobacco: Never Used   Substance and Sexual Activity    Alcohol use: Yes     Frequency: 2-3 times a week     Drinks per session: 1 or 2     Binge frequency: Less than monthly     Comment: rarely    Drug use: Never    Sexual activity: Not on file   Lifestyle    Physical activity     Days per week: 3 days     Minutes per session: 30 min    Stress: Only a little   Relationships    Social connections     Talks on phone: More than three times a week     Gets together: Once a week     Attends Catholic service: Not on file     Active member of club or organization: No     Attends meetings of clubs or organizations: Patient refused     Relationship status:    Other Topics Concern    Not on file   Social History Narrative    Not on file     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     PHYSICAL EXAMINATION:   General    Vitals:    09/28/20 1552   Weight: 70.8 kg (155 lb 15.6 oz)   Height: 5' 1" (1.549 m)     Constitutional: Oriented to person, place, and time. No apparent distress. Pleasant.  HENT:   Head: Normocephalic and atraumatic.   Eyes: Right eye exhibits no discharge. Left eye exhibits no discharge. No scleral icterus.   Pulmonary/Chest: Effort normal. No respiratory distress.   Abdominal: There is no guarding.   Neurological: Alert and oriented to person, place, and time.   Psychiatric: Behavior is normal.   Right Elbow Exam     Tenderness   Right elbow tenderness location: tender along the triceps insertion upon the olecranon.     Range of Motion   Extension: 0 (There is some mild posterior pain with terminal extension)   Flexion: 140   Pronation: normal   Supination: normal     Muscle Strength   Pronation:  5/5   Supination:  5/5     Tests   Varus: negative  Valgus: negative  Tinel's sign (cubital tunnel): negative    Other   Erythema: absent  Scars: present  Sensation: normal  Pulse: present    Comments:  Negative Cozen's test  Incision is healed  Posterior pain with resisted elbow extension        INSPECTION: There is no swelling, erythema or gross deformity of the elbow.  Faint ecchymosis over the lateral elbow.    Imaging  MRI Right elbow 6/8/20  Impression:  1. Subtle edema at the distal insertion of the brachialis tendon suggestive of mild tendinopathy.  2. Edema within the distal fibers of the triceps tendon with adjacent peritendinous edema compatible with tendinopathy.  3. Trace edema within the common extensor tendon origin may reflect minimal tendinopathy.  4. There is increased T2 signal demonstrated within the ulnar nerve at the cubital tunnel level should be correlated for ulnar neuropathy..     X-ray right elbow 5/29/18:  No displaced fracture or dislocation is appreciated.    Data Reviewed: X-ray, MRI    Supportive Actions: Independent " visualization of images or test specimens    ASSESSMENT:   1. Tendinitis of right triceps      PLAN:     1. Ina appear to be responding well to the previous Tenex of the lateral epicondyle, however she appears to be reporting more symptoms from her triceps tendinitis at this time.  She has a negative Cozen's test, and minimal tenderness at the lateral epicondyle.  She does have tenderness to palpation about the distal triceps insertion and pain with resisted elbow extension.  Diagnostic ultrasound exam today of the area did show cortical irregularity of the olecranon with an osteophyte projecting proximally into the tendon substance.  There was a mild to moderate degree of hyperemia surrounding the distal triceps tendon.  We discussed her her options regarding her triceps tendinosis.  We discussed physical therapy, injection of platelet rich plasma, and the Tenex procedure.  After discussion she wished to proceed with the Tenex procedure to the right distal triceps tendon.  She is familiar with this procedure for her lateral epicondyle.  She understands potential need for alternative interventions besides Tenex, however, we are optimistic that the Tenex procedure would remove her pathologic tendon tissue allowing feeling in of healthier tissue.    2.  Return in the next few weeks for Tenex procedure to the right triceps tendon.    The above note was completed, in part, with the aid of Dragon dictation software/hardware. Translation errors may be present.

## 2020-09-29 NOTE — H&P (VIEW-ONLY)
[facility]    CHIEF COMPLAINT:   Chief Complaint   Patient presents with    Elbow Pain     right     HISTORY OF PRESENT ILLNESS: Ina Pereira is a RHD 40 y.o. female who returns today for follow up. She is about 2 weeks status post Tenex procedure to the right common extensor tendon.  She reports she may gradual improvement and was largely pain free for about 6 weeks.  She notes over the past couple of weeks she has been experiencing the insidious onset of posterior right elbow pain the pain sometimes radiates upward all the way to her neck.  She rates her pain as a 3 on a scale of 1-10.  The pain is increased with increased use of the right arm.  There is some swelling and bruising of the area.    Review of Systems   Constitutional: Negative for fever.   HENT: Negative for drooling.    Eyes: Negative for discharge.   Respiratory: Negative for choking.    Cardiovascular: Negative for chest pain.   Genitourinary: Negative for flank pain.   Skin: Negative for wound.   Allergic/Immunologic: Negative for immunocompromised state.   Neurological: Negative for tremors and syncope.   Psychiatric/Behavioral: Negative for behavioral problems.     Past Medical History:   Past Medical History:   Diagnosis Date    Abnormal vaginal Pap smear     ADHD (attention deficit hyperactivity disorder)     Alopecia     General anesthetics causing adverse effect in therapeutic use        Past Surgical History:   Past Surgical History:   Procedure Laterality Date    CERVIX REMOVAL      cone biopsy      PERCUTANEOUS TENOTOMY OF ELBOW Right 7/24/2020    Procedure: Ultrasonic percutaneous tenotomy of the right common extensor tendon;  Surgeon: Gordy Yang MD;  Location: [facility] OR;  Service: Orthopedics;  Laterality: Right;    WISDOM TOOTH EXTRACTION         Family History:   Family History   Problem Relation Age of Onset    Diabetes Mother     Hypertension Father     Diabetes Father      Hyperlipidemia Father        Medications:   Current Outpatient Medications on File Prior to Visit   Medication Sig Dispense Refill    lisdexamfetamine (VYVANSE) 70 MG capsule VYVANSE 70 MG CAPS      traMADoL (ULTRAM) 50 mg tablet Take 1 tablet (50 mg total) by mouth every 4 (four) hours as needed for Pain. (Patient not taking: Reported on 2020) 6 tablet 0     No current facility-administered medications on file prior to visit.        Allergies: Review of patient's allergies indicates:  No Known Allergies    Social History:   Social History     Socioeconomic History    Marital status:      Spouse name: Not on file    Number of children: Not on file    Years of education: Not on file    Highest education level: Not on file   Occupational History    Not on file   Social Needs    Financial resource strain: Not very hard    Food insecurity     Worry: Never true     Inability: Never true    Transportation needs     Medical: No     Non-medical: No   Tobacco Use    Smoking status: Current Every Day Smoker     Types: Vaping with nicotine     Last attempt to quit: 2014     Years since quittin.3    Smokeless tobacco: Never Used   Substance and Sexual Activity    Alcohol use: Yes     Frequency: 2-3 times a week     Drinks per session: 1 or 2     Binge frequency: Less than monthly     Comment: rarely    Drug use: Never    Sexual activity: Not on file   Lifestyle    Physical activity     Days per week: 3 days     Minutes per session: 30 min    Stress: Only a little   Relationships    Social connections     Talks on phone: More than three times a week     Gets together: Once a week     Attends [employer] service: Not on file     Active member of club or organization: No     Attends meetings of clubs or organizations: Patient refused     Relationship status:    Other Topics Concern    Not on file   Social History Narrative    Not on file     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     PHYSICAL EXAMINATION:   General    Vitals:    09/28/20 1552   Weight: 70.8 kg (155 lb 15.6 oz)   Height: 5' 1" (1.549 m)     Constitutional: Oriented to person, place, and time. No apparent distress. Pleasant.  HENT:   Head: Normocephalic and atraumatic.   Eyes: Right eye exhibits no discharge. Left eye exhibits no discharge. No scleral icterus.   Pulmonary/Chest: Effort normal. No respiratory distress.   Abdominal: There is no guarding.   Neurological: Alert and oriented to person, place, and time.   Psychiatric: Behavior is normal.   Right Elbow Exam     Tenderness   Right elbow tenderness location: tender along the triceps insertion upon the olecranon.     Range of Motion   Extension: 0 (There is some mild posterior pain with terminal extension)   Flexion: 140   Pronation: normal   Supination: normal     Muscle Strength   Pronation:  5/5   Supination:  5/5     Tests   Varus: negative  Valgus: negative  Tinel's sign (cubital tunnel): negative    Other   Erythema: absent  Scars: present  Sensation: normal  Pulse: present    Comments:  Negative Cozen's test  Incision is healed  Posterior pain with resisted elbow extension        INSPECTION: There is no swelling, erythema or gross deformity of the elbow.  Faint ecchymosis over the lateral elbow.    Imaging  MRI Right elbow 6/8/20  Impression:  1. Subtle edema at the distal insertion of the brachialis tendon suggestive of mild tendinopathy.  2. Edema within the distal fibers of the triceps tendon with adjacent peritendinous edema compatible with tendinopathy.  3. Trace edema within the common extensor tendon origin may reflect minimal tendinopathy.  4. There is increased T2 signal demonstrated within the ulnar nerve at the cubital tunnel level should be correlated for ulnar neuropathy..     X-ray right elbow 5/29/18:  No displaced fracture or dislocation is appreciated.    Data Reviewed: X-ray, MRI    Supportive Actions: Independent " visualization of images or test specimens    ASSESSMENT:   1. Tendinitis of right triceps      PLAN:     1. Ina appear to be responding well to the previous Tenex of the lateral epicondyle, however she appears to be reporting more symptoms from her triceps tendinitis at this time.  She has a negative Cozen's test, and minimal tenderness at the lateral epicondyle.  She does have tenderness to palpation about the distal triceps insertion and pain with resisted elbow extension.  Diagnostic ultrasound exam today of the area did show cortical irregularity of the olecranon with an osteophyte projecting proximally into the tendon substance.  There was a mild to moderate degree of hyperemia surrounding the distal triceps tendon.  We discussed her her options regarding her triceps tendinosis.  We discussed physical therapy, injection of platelet rich plasma, and the Tenex procedure.  After discussion she wished to proceed with the Tenex procedure to the right distal triceps tendon.  She is familiar with this procedure for her lateral epicondyle.  She understands potential need for alternative interventions besides Tenex, however, we are optimistic that the Tenex procedure would remove her pathologic tendon tissue allowing feeling in of healthier tissue.    2.  Return in the next few weeks for Tenex procedure to the right triceps tendon.    The above note was completed, in part, with the aid of Dragon dictation software/hardware. Translation errors may be present.

## 2020-09-30 ENCOUNTER — PATIENT MESSAGE (OUTPATIENT)
Dept: SURGERY | Facility: HOSPITAL | Age: 40
End: 2020-09-30

## 2020-10-13 ENCOUNTER — LAB VISIT (OUTPATIENT)
Dept: FAMILY MEDICINE | Facility: CLINIC | Age: 40
End: 2020-10-13
Payer: COMMERCIAL

## 2020-10-13 DIAGNOSIS — M77.9 ACUTE CALCIFIC PERIARTHRITIS: ICD-10-CM

## 2020-10-13 PROCEDURE — U0003 INFECTIOUS AGENT DETECTION BY NUCLEIC ACID (DNA OR RNA); SEVERE ACUTE RESPIRATORY SYNDROME CORONAVIRUS 2 (SARS-COV-2) (CORONAVIRUS DISEASE [COVID-19]), AMPLIFIED PROBE TECHNIQUE, MAKING USE OF HIGH THROUGHPUT TECHNOLOGIES AS DESCRIBED BY CMS-2020-01-R: HCPCS

## 2020-10-14 LAB — SARS-COV-2 RNA RESP QL NAA+PROBE: NOT DETECTED

## 2020-10-16 ENCOUNTER — HOSPITAL ENCOUNTER (OUTPATIENT)
Facility: HOSPITAL | Age: 40
Discharge: HOME | End: 2020-10-16
Attending: PHYSICAL MEDICINE & REHABILITATION | Admitting: PHYSICAL MEDICINE & REHABILITATION
Payer: COMMERCIAL

## 2020-10-16 DIAGNOSIS — M77.9 ACUTE CALCIFIC PERIARTHRITIS: ICD-10-CM

## 2020-10-16 DIAGNOSIS — M25.521 RIGHT ELBOW PAIN: Primary | ICD-10-CM

## 2020-10-16 LAB
B-HCG UR QL: NEGATIVE
CTP QC/QA: YES

## 2020-10-16 PROCEDURE — 36000704 HC OR TIME LEV I 1ST 15 MIN: Mod: PO | Performed by: PHYSICAL MEDICINE & REHABILITATION

## 2020-10-16 PROCEDURE — 36000705 HC OR TIME LEV I EA ADD 15 MIN: Mod: PO | Performed by: PHYSICAL MEDICINE & REHABILITATION

## 2020-10-16 PROCEDURE — 24357 PR TENOTOMY ELBOW LATERAL/MEDIAL PERCUTANEOUS: ICD-10-PCS | Mod: RT,,, | Performed by: PHYSICAL MEDICINE & REHABILITATION

## 2020-10-16 PROCEDURE — 63600175 PHARM REV CODE 636 W HCPCS: Mod: PO | Performed by: PHYSICAL MEDICINE & REHABILITATION

## 2020-10-16 PROCEDURE — 81025 URINE PREGNANCY TEST: CPT | Mod: PO | Performed by: PHYSICAL MEDICINE & REHABILITATION

## 2020-10-16 PROCEDURE — 27201423 OPTIME MED/SURG SUP & DEVICES STERILE SUPPLY: Mod: PO | Performed by: PHYSICAL MEDICINE & REHABILITATION

## 2020-10-16 PROCEDURE — 25000003 PHARM REV CODE 250: Mod: PO | Performed by: PHYSICAL MEDICINE & REHABILITATION

## 2020-10-16 PROCEDURE — 24357 REPAIR ELBOW PERC: CPT | Mod: RT,,, | Performed by: PHYSICAL MEDICINE & REHABILITATION

## 2020-10-16 RX ORDER — LIDOCAINE HYDROCHLORIDE 10 MG/ML
1 INJECTION, SOLUTION EPIDURAL; INFILTRATION; INTRACAUDAL; PERINEURAL ONCE
Status: DISCONTINUED | OUTPATIENT
Start: 2020-10-16 | End: 2020-10-16 | Stop reason: HOSPADM

## 2020-10-16 RX ORDER — SODIUM CHLORIDE, SODIUM LACTATE, POTASSIUM CHLORIDE, CALCIUM CHLORIDE 600; 310; 30; 20 MG/100ML; MG/100ML; MG/100ML; MG/100ML
INJECTION, SOLUTION INTRAVENOUS CONTINUOUS
Status: DISCONTINUED | OUTPATIENT
Start: 2020-10-16 | End: 2020-10-16 | Stop reason: HOSPADM

## 2020-10-16 RX ORDER — LIDOCAINE HYDROCHLORIDE 10 MG/ML
INJECTION INFILTRATION; PERINEURAL
Status: DISCONTINUED | OUTPATIENT
Start: 2020-10-16 | End: 2020-10-16 | Stop reason: HOSPADM

## 2020-10-16 RX ORDER — FENTANYL CITRATE 50 UG/ML
INJECTION, SOLUTION INTRAMUSCULAR; INTRAVENOUS
Status: DISCONTINUED | OUTPATIENT
Start: 2020-10-16 | End: 2020-10-16 | Stop reason: HOSPADM

## 2020-10-16 RX ORDER — MIDAZOLAM HYDROCHLORIDE 1 MG/ML
2 INJECTION INTRAMUSCULAR; INTRAVENOUS ONCE AS NEEDED
Status: COMPLETED | OUTPATIENT
Start: 2020-10-16 | End: 2020-10-16

## 2020-10-16 RX ADMIN — SODIUM CHLORIDE, SODIUM LACTATE, POTASSIUM CHLORIDE, AND CALCIUM CHLORIDE: .6; .31; .03; .02 INJECTION, SOLUTION INTRAVENOUS at 09:10

## 2020-10-16 NOTE — OP NOTE
Operative Note       Surgery Date: 10/16/2020     Surgeon(s) and Role:     * Gordy Yang MD - Primary    Pre-op Diagnosis:    1. Acute calcific periarthritis [M77.9]  2. Triceps tendinosis    Post-op Diagnosis: Post-Op Diagnosis Codes:  1. Acute calcific periarthritis [M77.9]  2. Triceps tendinosis    Procedure(s) (LRB):  TENOTOMY, ELBOW, PERCUTANEOUS TENEX Right elbow (Right) of Triceps brachii and olecranon attachement    Anesthesia: RN IV Sedation    Procedure in Detail/Findings:    Indications:       This is a 40 y.o. female with recalcitrant right triceps tendinosis who presents for elective percutaneous tenotomy of the triceps tendon.  We discussed the risk, benefits and alternatives, and she elected to proceed.      Description of Procedure:      Once she was brought to the operating room, a time-out was performed confirming the name, the location and the procedure.      The patient was placed prone with the right elbow flexed 90 degrees.  The elbow was prepped in the usual sterile fashion with ChloraPrep.  Using ultrasound, the distal triceps tendon was identified and noted to be hypoechoic and heterogenous with increased neovascularity, consistent with a diagnosis of tendinosis.  With an #11 blade, a small incision was made allowiing the TX1 handpiece to be introduced down to the tendon defects.  With 3 minutes and 55 seconds of energy time, the triceps tendon defects were ablated.  A sterile dressing was applied.  The patient was returned to the recovery area in stable condition. Placed in sling. We will follow up with her in two weeks in the clinic. Discussed postoperative rehab protocol, minimal use of arm for next 2 weeks.      Estimated Blood Loss: Minimal           Condition: Good    Attestation:  I performed the procedure.    The patient was given conscious sedation by a registered nurse with me in attendance. The agents used were fentanyl and Versed. There was continuous monitoring of EKG,  blood pressure and pulse oximetry. Total time for conscious sedation was 33 minutes.          Discharge Note    Admit Date: 10/16/2020    Attending Physician: Gordy Yang MD     Discharge Physician: Gordy Yang MD    Final Diagnosis: Post-Op Diagnosis Codes:     * Acute calcific periarthritis [M77.9]    Disposition: Home or Self Care, pt discharged and will f/u in clinic in 2 weeks.    Patient Instructions:   Current Discharge Medication List      CONTINUE these medications which have NOT CHANGED    Details   lisdexamfetamine (VYVANSE) 70 MG capsule VYVANSE 70 MG CAPS      traMADoL (ULTRAM) 50 mg tablet Take 1 tablet (50 mg total) by mouth every 4 (four) hours as needed for Pain.  Qty: 6 tablet, Refills: 0             Discharge Procedure Orders (must include Diet, Follow-up, Activity)   Discharge Procedure Orders (must include Diet, Follow-up, Activity)   Diet general     Ice to affected area     Call MD for:  temperature >100.4     Call MD for:  persistent nausea and vomiting     Call MD for:  severe uncontrolled pain     Call MD for:  difficulty breathing, headache or visual disturbances     Call MD for:  redness, tenderness, or signs of infection (pain, swelling, redness, odor or green/yellow discharge around incision site)     Call MD for:  hives     Call MD for:  persistent dizziness or light-headedness     Call MD for:  extreme fatigue     Remove dressing in 48 hours     Shower on day dressing removed (No bath)        Discharge Date: 10/16/20

## 2020-10-16 NOTE — INTERVAL H&P NOTE
The patient has been examined and the H&P has been reviewed:    I concur with the findings and no changes have occurred since H&P was written.   Mallampati score II, ASA 1    Anesthesia/Surgery risks, benefits and alternative options discussed and understood by patient/family.          There are no hospital problems to display for this patient.

## 2020-10-19 VITALS
TEMPERATURE: 98 F | OXYGEN SATURATION: 100 % | BODY MASS INDEX: 29.47 KG/M2 | WEIGHT: 156.06 LBS | RESPIRATION RATE: 13 BRPM | DIASTOLIC BLOOD PRESSURE: 80 MMHG | SYSTOLIC BLOOD PRESSURE: 128 MMHG | HEART RATE: 77 BPM | HEIGHT: 61 IN

## 2020-11-03 ENCOUNTER — OFFICE VISIT (OUTPATIENT)
Dept: PHYSICAL MEDICINE AND REHAB | Facility: CLINIC | Age: 40
End: 2020-11-03
Payer: COMMERCIAL

## 2020-11-03 VITALS — BODY MASS INDEX: 29.45 KG/M2 | HEIGHT: 61 IN | WEIGHT: 156 LBS

## 2020-11-03 DIAGNOSIS — M77.8 TENDINITIS OF RIGHT TRICEPS: Primary | ICD-10-CM

## 2020-11-03 PROCEDURE — 99024 POSTOP FOLLOW-UP VISIT: CPT | Mod: S$GLB,,, | Performed by: PHYSICAL MEDICINE & REHABILITATION

## 2020-11-03 PROCEDURE — 99999 PR PBB SHADOW E&M-EST. PATIENT-LVL III: CPT | Mod: PBBFAC,,, | Performed by: PHYSICAL MEDICINE & REHABILITATION

## 2020-11-03 PROCEDURE — 99024 PR POST-OP FOLLOW-UP VISIT: ICD-10-PCS | Mod: S$GLB,,, | Performed by: PHYSICAL MEDICINE & REHABILITATION

## 2020-11-03 PROCEDURE — 99999 PR PBB SHADOW E&M-EST. PATIENT-LVL III: ICD-10-PCS | Mod: PBBFAC,,, | Performed by: PHYSICAL MEDICINE & REHABILITATION

## 2020-11-03 NOTE — LETTER
November 3, 2020    Ina Pereira  [address] [place] [address]             [place] [facility]  [address]  [place] [address]  Phone: [phone]  Fax: [phone]   November 3, 2020     Patient: Ina Pereira   YOB: 1980   Date of Visit: 11/3/2020       To Whom it May Concern:    Ina Pereira was seen in my clinic on 11/3/2020. She may return to full duty work on 11/7/2020.    If you have any questions or concerns, please don't hesitate to call.    Sincerely,       Gordy Yang MD

## 2020-11-03 NOTE — PROGRESS NOTES
[facility]    CHIEF COMPLAINT:   Chief Complaint   Patient presents with    Follow-up     2.5 wk s/p L Elbow Tenex     HISTORY OF PRESENT ILLNESS: Ina Pereira is a RHD 40 y.o. female who returns today for follow up. She is about 2 weeks status post Tenex procedure to the right triceps tendon.  She had some increased pain postprocedure which gradually subsided.  She denies any pain at rest but does have some stiffness and soreness the posterior right elbow.  There is some radiation proximally into the shoulder area.  She denies any swelling or bruising of the area.  She denies any issues with the skin incision.  She has been performing some routine home stretches.    Review of Systems   Constitutional: Negative for fever.   HENT: Negative for drooling.    Eyes: Negative for discharge.   Respiratory: Negative for choking.    Cardiovascular: Negative for chest pain.   Genitourinary: Negative for flank pain.   Skin: Negative for wound.   Allergic/Immunologic: Negative for immunocompromised state.   Neurological: Negative for tremors and syncope.   Psychiatric/Behavioral: Negative for behavioral problems.     Past Medical History:   Past Medical History:   Diagnosis Date    Abnormal vaginal Pap smear     ADHD (attention deficit hyperactivity disorder)     Alopecia     General anesthetics causing adverse effect in therapeutic use        Past Surgical History:   Past Surgical History:   Procedure Laterality Date    CERVIX REMOVAL      cone biopsy      PERCUTANEOUS TENOTOMY OF ELBOW Right 7/24/2020    Procedure: Ultrasonic percutaneous tenotomy of the right common extensor tendon;  Surgeon: Gordy Yang MD;  Location: [facility] OR;  Service: Orthopedics;  Laterality: Right;    PERCUTANEOUS TENOTOMY OF ELBOW Right 10/16/2020    Procedure: TENOTOMY, ELBOW, PERCUTANEOUS TENEX Right elbow;  Surgeon: Gordy Yang MD;  Location: [facility] OR;  Service: Orthopedics;  Laterality:  Right;    WISDOM TOOTH EXTRACTION         Family History:   Family History   Problem Relation Age of Onset    Diabetes Mother     Hypertension Father     Diabetes Father     Hyperlipidemia Father        Medications:   Current Outpatient Medications on File Prior to Visit   Medication Sig Dispense Refill    lisdexamfetamine (VYVANSE) 70 MG capsule VYVANSE 70 MG CAPS      traMADoL (ULTRAM) 50 mg tablet Take 1 tablet (50 mg total) by mouth every 4 (four) hours as needed for Pain. (Patient not taking: Reported on 2020) 6 tablet 0     No current facility-administered medications on file prior to visit.        Allergies: Review of patient's allergies indicates:  No Known Allergies    Social History:   Social History     Socioeconomic History    Marital status:      Spouse name: Not on file    Number of children: Not on file    Years of education: Not on file    Highest education level: Not on file   Occupational History    Not on file   Social Needs    Financial resource strain: Not very hard    Food insecurity     Worry: Never true     Inability: Never true    Transportation needs     Medical: No     Non-medical: No   Tobacco Use    Smoking status: Current Every Day Smoker     Types: Vaping with nicotine     Last attempt to quit: 2014     Years since quittin.4    Smokeless tobacco: Never Used   Substance and Sexual Activity    Alcohol use: Yes     Frequency: 2-3 times a week     Drinks per session: 1 or 2     Binge frequency: Less than monthly     Comment: rarely    Drug use: Never    Sexual activity: Not on file   Lifestyle    Physical activity     Days per week: 3 days     Minutes per session: 30 min    Stress: Only a little   Relationships    Social connections     Talks on phone: More than three times a week     Gets together: Once a week     Attends Mu-ism service: Not on file     Active member of club or organization: No     Attends meetings of clubs or organizations:  "Patient refused     Relationship status:    Other Topics Concern    Not on file   Social History Narrative    Not on file     Ina works at [employer] in [employer] at [employer].     PHYSICAL EXAMINATION:   General    Vitals:    11/03/20 0833   Weight: 70.8 kg (156 lb)   Height: 5' 1" (1.549 m)     Constitutional: Oriented to person, place, and time. No apparent distress. Pleasant.  HENT:   Head: Normocephalic and atraumatic.   Eyes: Right eye exhibits no discharge. Left eye exhibits no discharge. No scleral icterus.   Pulmonary/Chest: Effort normal. No respiratory distress.   Abdominal: There is no guarding.   Neurological: Alert and oriented to person, place, and time.   Psychiatric: Behavior is normal.   Right Elbow Exam     Tenderness   Right elbow tenderness location: tender along the triceps insertion upon the olecranon.     Range of Motion   Extension: 0 (There is some mild posterior pain with terminal extension)   Flexion: 140   Pronation: normal   Supination: normal     Muscle Strength   Pronation:  5/5   Supination:  5/5     Tests   Varus: negative  Valgus: negative  Tinel's sign (cubital tunnel): negative    Other   Erythema: absent  Scars: present  Sensation: normal  Pulse: present    Comments:  Negative Cozen's test  Incision is healed  Posterior pain with resisted elbow extension        INSPECTION: There is no swelling, erythema, or gross deformity of the elbow.     Imaging  MRI Right elbow 6/8/20  Impression:  1. Subtle edema at the distal insertion of the brachialis tendon suggestive of mild tendinopathy.  2. Edema within the distal fibers of the triceps tendon with adjacent peritendinous edema compatible with tendinopathy.  3. Trace edema within the common extensor tendon origin may reflect minimal tendinopathy.  4. There is increased T2 signal demonstrated within the ulnar nerve at the cubital tunnel level should be correlated for ulnar neuropathy..     X-ray right elbow " 5/29/18:  No displaced fracture or dislocation is appreciated.    Data Reviewed: X-ray, MRI    Supportive Actions: Independent visualization of images or test specimens    ASSESSMENT:   1. Tendinitis of right triceps      PLAN:     1. Ina is 2 weeks status post Tenex procedure to the right triceps tendon.  She is still having some soreness of the area but we discussed that we are still relatively early postprocedure and she should experience spontaneous improvement in the weeks going forward.  Encouraged her to continue to try to avoid strenuous use of the right upper extremity as her pain and soreness persist.  She will return to work in the next week but will be mindful of avoiding direct compression to the right elbow and performing forceful elbow extension movements.  We will also have her reestablished in formal physical therapy working on triceps stretching and soft tissue work proximal to the site of Tenex.  I would like to avoid any aggressive interventions directly to the site, however she may have some ultrasound of the area.  She may take Tylenol and NSAIDs as needed for pain.  She has ordered a pad elbow brace which do believe male per protect the area as it heals.    2.  We will plan to call her and the next 4 weeks to assess her progress.  She was encouraged to reach out to us if any questions or issues arise in the meantime.    The above note was completed, in part, with the aid of Dragon dictation software/hardware. Translation errors may be present.

## 2020-11-09 ENCOUNTER — TELEPHONE (OUTPATIENT)
Dept: PHYSICAL MEDICINE AND REHAB | Facility: CLINIC | Age: 40
End: 2020-11-09

## 2020-11-09 ENCOUNTER — PATIENT MESSAGE (OUTPATIENT)
Dept: PHYSICAL MEDICINE AND REHAB | Facility: CLINIC | Age: 40
End: 2020-11-09

## 2020-11-09 DIAGNOSIS — M77.8 TENDINITIS OF RIGHT TRICEPS: Primary | ICD-10-CM

## 2020-11-19 PROBLEM — M77.8 TENDINITIS OF RIGHT TRICEPS: Status: ACTIVE | Noted: 2020-11-19

## 2020-12-07 DIAGNOSIS — M79.18 MYOFASCIAL PAIN DYSFUNCTION SYNDROME: Primary | ICD-10-CM

## 2020-12-10 ENCOUNTER — PATIENT MESSAGE (OUTPATIENT)
Dept: PHYSICAL MEDICINE AND REHAB | Facility: CLINIC | Age: 40
End: 2020-12-10

## 2020-12-10 NOTE — TELEPHONE ENCOUNTER
Spoke with the patient. Reports to be 75% better than pre-procedure. [facility] to [facility], going well. Dry needling faxed over today for dry needling to the neck/shoudler region.

## 2020-12-15 ENCOUNTER — TELEPHONE (OUTPATIENT)
Dept: PHYSICAL MEDICINE AND REHAB | Facility: CLINIC | Age: 40
End: 2020-12-15

## 2020-12-15 ENCOUNTER — PATIENT MESSAGE (OUTPATIENT)
Dept: PHYSICAL MEDICINE AND REHAB | Facility: CLINIC | Age: 40
End: 2020-12-15

## 2020-12-15 DIAGNOSIS — M25.561 ARTHRALGIA OF BOTH KNEES: Primary | ICD-10-CM

## 2020-12-15 DIAGNOSIS — M25.562 ARTHRALGIA OF BOTH KNEES: Primary | ICD-10-CM

## 2020-12-16 ENCOUNTER — PATIENT MESSAGE (OUTPATIENT)
Dept: PHYSICAL MEDICINE AND REHAB | Facility: CLINIC | Age: 40
End: 2020-12-16

## 2020-12-17 RX ORDER — METHYLPREDNISOLONE 4 MG/1
TABLET ORAL
Qty: 1 PACKAGE | Refills: 0 | Status: SHIPPED | OUTPATIENT
Start: 2020-12-17 | End: 2021-01-07

## 2020-12-27 ENCOUNTER — CLINICAL SUPPORT (OUTPATIENT)
Dept: URGENT CARE | Facility: CLINIC | Age: 40
End: 2020-12-27
Payer: COMMERCIAL

## 2020-12-27 VITALS — OXYGEN SATURATION: 99 % | TEMPERATURE: 98 F

## 2020-12-27 DIAGNOSIS — U07.1 COVID-19 VIRUS DETECTED: ICD-10-CM

## 2020-12-27 DIAGNOSIS — R43.2 LOSS OF TASTE: Primary | ICD-10-CM

## 2020-12-27 LAB
CTP QC/QA: YES
SARS-COV-2 RDRP RESP QL NAA+PROBE: POSITIVE

## 2020-12-27 PROCEDURE — U0002: ICD-10-PCS | Mod: QW,S$GLB,, | Performed by: FAMILY MEDICINE

## 2020-12-27 PROCEDURE — U0002 COVID-19 LAB TEST NON-CDC: HCPCS | Mod: QW,S$GLB,, | Performed by: FAMILY MEDICINE

## 2021-01-27 PROBLEM — M77.8 TENDINITIS OF RIGHT TRICEPS: Status: RESOLVED | Noted: 2020-11-19 | Resolved: 2021-01-27

## 2021-04-19 ENCOUNTER — TELEPHONE (OUTPATIENT)
Dept: NEUROSURGERY | Facility: CLINIC | Age: 41
End: 2021-04-19

## 2021-05-19 ENCOUNTER — OFFICE VISIT (OUTPATIENT)
Dept: NEUROSURGERY | Facility: CLINIC | Age: 41
End: 2021-05-19
Payer: COMMERCIAL

## 2021-05-19 ENCOUNTER — PATIENT MESSAGE (OUTPATIENT)
Dept: PHYSICAL MEDICINE AND REHAB | Facility: CLINIC | Age: 41
End: 2021-05-19

## 2021-05-19 VITALS
WEIGHT: 161 LBS | HEIGHT: 61 IN | HEART RATE: 98 BPM | DIASTOLIC BLOOD PRESSURE: 96 MMHG | BODY MASS INDEX: 30.4 KG/M2 | SYSTOLIC BLOOD PRESSURE: 132 MMHG

## 2021-05-19 DIAGNOSIS — M25.521 ELBOW PAIN, CHRONIC, RIGHT: Primary | ICD-10-CM

## 2021-05-19 DIAGNOSIS — G89.29 ELBOW PAIN, CHRONIC, RIGHT: Primary | ICD-10-CM

## 2021-05-19 PROCEDURE — 99999 PR PBB SHADOW E&M-EST. PATIENT-LVL III: ICD-10-PCS | Mod: PBBFAC,,, | Performed by: STUDENT IN AN ORGANIZED HEALTH CARE EDUCATION/TRAINING PROGRAM

## 2021-05-19 PROCEDURE — 3008F PR BODY MASS INDEX (BMI) DOCUMENTED: ICD-10-PCS | Mod: CPTII,S$GLB,, | Performed by: STUDENT IN AN ORGANIZED HEALTH CARE EDUCATION/TRAINING PROGRAM

## 2021-05-19 PROCEDURE — 1125F PR PAIN SEVERITY QUANTIFIED, PAIN PRESENT: ICD-10-PCS | Mod: S$GLB,,, | Performed by: STUDENT IN AN ORGANIZED HEALTH CARE EDUCATION/TRAINING PROGRAM

## 2021-05-19 PROCEDURE — 3008F BODY MASS INDEX DOCD: CPT | Mod: CPTII,S$GLB,, | Performed by: STUDENT IN AN ORGANIZED HEALTH CARE EDUCATION/TRAINING PROGRAM

## 2021-05-19 PROCEDURE — 99999 PR PBB SHADOW E&M-EST. PATIENT-LVL III: CPT | Mod: PBBFAC,,, | Performed by: STUDENT IN AN ORGANIZED HEALTH CARE EDUCATION/TRAINING PROGRAM

## 2021-05-19 PROCEDURE — 99204 OFFICE O/P NEW MOD 45 MIN: CPT | Mod: S$GLB,,, | Performed by: STUDENT IN AN ORGANIZED HEALTH CARE EDUCATION/TRAINING PROGRAM

## 2021-05-19 PROCEDURE — 99204 PR OFFICE/OUTPT VISIT, NEW, LEVL IV, 45-59 MIN: ICD-10-PCS | Mod: S$GLB,,, | Performed by: STUDENT IN AN ORGANIZED HEALTH CARE EDUCATION/TRAINING PROGRAM

## 2021-05-19 PROCEDURE — 1125F AMNT PAIN NOTED PAIN PRSNT: CPT | Mod: S$GLB,,, | Performed by: STUDENT IN AN ORGANIZED HEALTH CARE EDUCATION/TRAINING PROGRAM

## 2021-05-21 ENCOUNTER — TELEPHONE (OUTPATIENT)
Dept: ORTHOPEDICS | Facility: CLINIC | Age: 41
End: 2021-05-21

## 2021-05-31 ENCOUNTER — OFFICE VISIT (OUTPATIENT)
Dept: PHYSICAL MEDICINE AND REHAB | Facility: CLINIC | Age: 41
End: 2021-05-31
Payer: COMMERCIAL

## 2021-05-31 VITALS — BODY MASS INDEX: 31.37 KG/M2 | HEIGHT: 61 IN | WEIGHT: 166.13 LBS

## 2021-05-31 DIAGNOSIS — M77.11 RIGHT LATERAL EPICONDYLITIS: ICD-10-CM

## 2021-05-31 DIAGNOSIS — M25.521 ELBOW PAIN, CHRONIC, RIGHT: Primary | ICD-10-CM

## 2021-05-31 DIAGNOSIS — G89.29 ELBOW PAIN, CHRONIC, RIGHT: Primary | ICD-10-CM

## 2021-05-31 PROCEDURE — 3008F BODY MASS INDEX DOCD: CPT | Mod: CPTII,S$GLB,, | Performed by: PHYSICAL MEDICINE & REHABILITATION

## 2021-05-31 PROCEDURE — 3008F PR BODY MASS INDEX (BMI) DOCUMENTED: ICD-10-PCS | Mod: CPTII,S$GLB,, | Performed by: PHYSICAL MEDICINE & REHABILITATION

## 2021-05-31 PROCEDURE — 99499 NO LOS: ICD-10-PCS | Mod: S$GLB,,, | Performed by: PHYSICAL MEDICINE & REHABILITATION

## 2021-05-31 PROCEDURE — 99999 PR PBB SHADOW E&M-EST. PATIENT-LVL III: ICD-10-PCS | Mod: PBBFAC,,, | Performed by: PHYSICAL MEDICINE & REHABILITATION

## 2021-05-31 PROCEDURE — 95886 MUSC TEST DONE W/N TEST COMP: CPT | Mod: S$GLB,,, | Performed by: PHYSICAL MEDICINE & REHABILITATION

## 2021-05-31 PROCEDURE — 95910 NRV CNDJ TEST 7-8 STUDIES: CPT | Mod: S$GLB,,, | Performed by: PHYSICAL MEDICINE & REHABILITATION

## 2021-05-31 PROCEDURE — 95886 PR EMG COMPLETE, W/ NERVE CONDUCTION STUDIES, 5+ MUSCLES: ICD-10-PCS | Mod: S$GLB,,, | Performed by: PHYSICAL MEDICINE & REHABILITATION

## 2021-05-31 PROCEDURE — 99499 UNLISTED E&M SERVICE: CPT | Mod: S$GLB,,, | Performed by: PHYSICAL MEDICINE & REHABILITATION

## 2021-05-31 PROCEDURE — 1125F PR PAIN SEVERITY QUANTIFIED, PAIN PRESENT: ICD-10-PCS | Mod: S$GLB,,, | Performed by: PHYSICAL MEDICINE & REHABILITATION

## 2021-05-31 PROCEDURE — 99999 PR PBB SHADOW E&M-EST. PATIENT-LVL III: CPT | Mod: PBBFAC,,, | Performed by: PHYSICAL MEDICINE & REHABILITATION

## 2021-05-31 PROCEDURE — 95910 PR NERVE CONDUCTION STUDY; 7-8 STUDIES: ICD-10-PCS | Mod: S$GLB,,, | Performed by: PHYSICAL MEDICINE & REHABILITATION

## 2021-05-31 PROCEDURE — 1125F AMNT PAIN NOTED PAIN PRSNT: CPT | Mod: S$GLB,,, | Performed by: PHYSICAL MEDICINE & REHABILITATION

## 2022-07-06 PROBLEM — R63.5 ABNORMAL WEIGHT GAIN: Chronic | Status: ACTIVE | Noted: 2022-07-06

## 2022-09-08 PROBLEM — E80.4 GILBERT'S SYNDROME: Chronic | Status: ACTIVE | Noted: 2022-09-08

## 2022-11-08 PROBLEM — E78.2 MIXED HYPERLIPIDEMIA: Status: ACTIVE | Noted: 2022-11-08

## 2022-11-08 PROBLEM — R63.5 ABNORMAL WEIGHT GAIN: Chronic | Status: RESOLVED | Noted: 2022-07-06 | Resolved: 2022-11-08

## 2022-11-08 PROBLEM — R73.03 PREDIABETES: Status: ACTIVE | Noted: 2022-11-08

## 2025-03-05 PROBLEM — R73.03 PREDIABETES: Status: RESOLVED | Noted: 2022-11-08 | Resolved: 2025-03-05

## 2025-03-05 PROBLEM — E11.9 TYPE 2 DIABETES MELLITUS WITHOUT COMPLICATION, WITHOUT LONG-TERM CURRENT USE OF INSULIN: Status: ACTIVE | Noted: 2025-03-05

## 2025-04-02 ENCOUNTER — ON-DEMAND VIRTUAL (OUTPATIENT)
Dept: URGENT CARE | Facility: CLINIC | Age: 45
End: 2025-04-02
Payer: COMMERCIAL

## 2025-04-02 DIAGNOSIS — J32.9 SINUSITIS, UNSPECIFIED CHRONICITY, UNSPECIFIED LOCATION: Primary | ICD-10-CM

## 2025-04-02 RX ORDER — AMOXICILLIN 875 MG/1
875 TABLET, FILM COATED ORAL EVERY 12 HOURS
Qty: 20 TABLET | Refills: 0 | Status: SHIPPED | OUTPATIENT
Start: 2025-04-02 | End: 2025-04-12

## 2025-04-02 RX ORDER — METHYLPREDNISOLONE 4 MG/1
TABLET ORAL
Qty: 21 EACH | Refills: 0 | Status: SHIPPED | OUTPATIENT
Start: 2025-04-02

## 2025-04-02 RX ORDER — IPRATROPIUM BROMIDE 21 UG/1
2 SPRAY, METERED NASAL 2 TIMES DAILY
Qty: 30 ML | Refills: 0 | Status: SHIPPED | OUTPATIENT
Start: 2025-04-02 | End: 2025-04-12

## 2025-04-02 NOTE — PROGRESS NOTES
Subjective:      Patient ID: Ina Pereira is a 44 y.o. female.    Vitals:  vitals were not taken for this visit.     Chief Complaint: Sinus Problem      Visit Type: TELE AUDIOVISUAL    Patient Location: Home     Present with the patient at the time of consultation: TELEMED PRESENT WITH PATIENT: None    Past Medical History:   Diagnosis Date    Abnormal vaginal Pap smear     ADHD (attention deficit hyperactivity disorder)     Alopecia     General anesthetics causing adverse effect in therapeutic use      Past Surgical History:   Procedure Laterality Date    CERVIX REMOVAL      cone biopsy      PERCUTANEOUS TENOTOMY OF ELBOW Right 7/24/2020    Procedure: Ultrasonic percutaneous tenotomy of the right common extensor tendon;  Surgeon: Gordy Yang MD;  Location: [facility] OR;  Service: Orthopedics;  Laterality: Right;    PERCUTANEOUS TENOTOMY OF ELBOW Right 10/16/2020    Procedure: TENOTOMY, ELBOW, PERCUTANEOUS TENEX Right elbow;  Surgeon: Gordy Yang MD;  Location: [facility] OR;  Service: Orthopedics;  Laterality: Right;    WISDOM TOOTH EXTRACTION       Review of patient's allergies indicates:   Allergen Reactions    Metformin Other (See Comments)     Diarrhea      Medications Ordered Prior to Encounter[1]  Family History   Problem Relation Name Age of Onset    Diabetes Mother      Hypertension Father      Diabetes Father      Hyperlipidemia Father         Medications Ordered                [id number] - [place], [place] - [address]. AT [address] [address]    Telephone: [phone]   Fax: [phone]   Hours: Not open 24 hours                         E-Prescribed (3 of 3)              amoxicillin (AMOXIL) 875 MG tablet    Sig: Take 1 tablet (875 mg total) by mouth every 12 (twelve) hours. for 10 days       Start: 4/2/25     Quantity: 20 tablet Refills: 0                         ipratropium (ATROVENT) 21 mcg (0.03 %) nasal spray    Sig:  2 sprays by Each Nostril route 2 (two) times daily. for 10 days       Start: 4/2/25     Quantity: 30 mL Refills: 0                         methylPREDNISolone (MEDROL DOSEPACK) 4 mg tablet    Sig: use as directed       Start: 4/2/25     Quantity: 21 each Refills: 0                           Ohs Peq Odvv Intake    4/2/2025  6:53 AM CDT - Filed by Patient   What is your current physical address in the event of a medical emergency? [address] [address]   Are you able to take your vital signs? No   Please attach any relevant images or files    Is your employer contracted with [facility]? Yes   Please enter your employer supplied coupon code. [id number]         Sinus Problem  This is a new problem. Episode onset: 1.5 weeks. The problem is unchanged. There has been no fever. Associated symptoms include congestion, coughing, headaches, a hoarse voice, sinus pressure and sneezing. Pertinent negatives include no neck pain, sore throat or swollen glands. Past treatments include nasal decongestants.       HENT:  Positive for congestion and sinus pressure. Negative for sore throat.    Neck: Negative for neck pain.   Respiratory:  Positive for cough.    Allergic/Immunologic: Positive for sneezing.   Neurological:  Positive for headaches.        Objective:   The physical exam was conducted virtually.  Physical Exam  Normal appearance  Sinus congestion  Assessment:     1. Sinusitis, unspecified chronicity, unspecified location        Plan:       Sinusitis, unspecified chronicity, unspecified location    Other orders  -     amoxicillin (AMOXIL) 875 MG tablet; Take 1 tablet (875 mg total) by mouth every 12 (twelve) hours. for 10 days  Dispense: 20 tablet; Refill: 0  -     methylPREDNISolone (MEDROL DOSEPACK) 4 mg tablet; use as directed  Dispense: 21 each; Refill: 0  -     ipratropium (ATROVENT) 21 mcg (0.03 %) nasal spray; 2 sprays by Each Nostril route 2 (two) times daily. for 10 days  Dispense: 30 mL; Refill:  0                        [1]   Current Outpatient Medications on File Prior to Visit   Medication Sig Dispense Refill    blood sugar diagnostic Strp To check BG 2 times daily, to use with insurance preferred meter 100 each 1    blood-glucose meter kit To check BG 2 times daily, to use with insurance preferred meter 1 each 0    finasteride (PROPECIA) 1 mg tablet Take 1 tablet (1 mg total) by mouth once daily. 90 tablet 1    lancets Misc To check BG 2 times daily, to use with insurance preferred meter 100 each 1    spironolactone (ALDACTONE) 50 MG tablet Take 50 mg by mouth once daily.      testosterone cypionate (DEPOTESTOTERONE CYPIONATE) 200 mg/mL injection Inject into the muscle every 14 (fourteen) days.      tirzepatide (MOUNJARO) 12.5 mg/0.5 mL PnIj Inject 12.5 mg into the skin every 7 days. 4 Pen 5    tretinoin (RETIN-A) 0.025 % cream APPLY TOPICALLY EVERY EVENING. 20 g 1    valACYclovir (VALTREX) 1000 MG tablet TAKE 1 TABLET (1,000 MG TOTAL) BY MOUTH ONCE DAILY. 30 tablet 0     No current facility-administered medications on file prior to visit.

## 2025-04-02 NOTE — PATIENT INSTRUCTIONS
You have been diagnosed with a sinus infection.   I have prescribed and antibiotic. Please start today and take until finished.   You can also take over the counter meds such as Flonase, Claritin, Dayquil etc.     If your symptoms persist please follow up with your pcp.  If you experience any severe symptoms go to the ER.      Thank you for choosing [facility]!    Our goal in the [facility] to always provide outstanding medical care. You may receive a survey by mail or e-mail in the next week regarding your experience today. We would greatly appreciate you completing and returning the survey. Your feedback provides us with a way to recognize our staff who provide very good care, and it helps us learn how to improve when your experience was below our aspiration of excellence.         We appreciate you trusting us with your medical care. We hope you feel better soon. We will be happy to take care of you for all of your future medical needs.    You must understand that you've received Virtual  treatment only and that you may be released before all your medical problems are known or treated. You, the patient, will arrange for follow up care as instructed.    Follow up with your PCP or specialty clinic as directed in the next 1-2 weeks if not improved or as needed.  You can call [phone] to schedule an appointment with the appropriate provider.    If your condition worsens we recommend that you receive another evaluation in person, with your primary care provider, urgent care or at the emergency room immediately or contact your primary medical clinics after hours call service to discuss your concerns.

## (undated) DEVICE — SPONGE DERMACEA 4X4IN 12PLY

## (undated) DEVICE — LABEL FOR UTILITY MARKER

## (undated) DEVICE — MARKER SKIN STND TIP BLUE BARR

## (undated) DEVICE — CATH INTROCAN CANN 18G 1-3/4IN

## (undated) DEVICE — SEE MEDLINE ITEM 152622

## (undated) DEVICE — PACK TX1 TISSUE REMOVAL SYS

## (undated) DEVICE — GLOVE SURGICAL LATEX SZ 8

## (undated) DEVICE — APPLICATOR CHLORAPREP CLR 10.5

## (undated) DEVICE — SOD CL BACT LS .09% 10 ML

## (undated) DEVICE — SEE MEDLINE ITEM 157128

## (undated) DEVICE — CUP MEDICINE STERILE 2OZ

## (undated) DEVICE — NDL 27G X 1 1/4

## (undated) DEVICE — SEE L#120831

## (undated) DEVICE — KIT COVER GENERAL PURPOSE